# Patient Record
Sex: FEMALE | URBAN - METROPOLITAN AREA
[De-identification: names, ages, dates, MRNs, and addresses within clinical notes are randomized per-mention and may not be internally consistent; named-entity substitution may affect disease eponyms.]

---

## 2020-02-14 PROBLEM — D50.8 OTHER IRON DEFICIENCY ANEMIAS: Status: ACTIVE | Noted: 2020-02-14

## 2022-06-08 ENCOUNTER — APPOINTMENT (RX ONLY)
Dept: URBAN - METROPOLITAN AREA CLINIC 33 | Facility: CLINIC | Age: 27
Setting detail: DERMATOLOGY
End: 2022-06-08

## 2022-06-08 VITALS
DIASTOLIC BLOOD PRESSURE: 79 MMHG | HEIGHT: 60 IN | WEIGHT: 150 LBS | SYSTOLIC BLOOD PRESSURE: 120 MMHG | HEART RATE: 97 BPM

## 2022-06-08 DIAGNOSIS — R03.0 ELEVATED BLOOD-PRESSURE READING, WITHOUT DIAGNOSIS OF HYPERTENSION: ICD-10-CM

## 2022-06-08 DIAGNOSIS — L20.89 OTHER ATOPIC DERMATITIS: ICD-10-CM

## 2022-06-08 PROCEDURE — ? COUNSELING

## 2022-06-08 PROCEDURE — ? PLAN FOR BMI MANAGEMENT

## 2022-06-08 PROCEDURE — 99203 OFFICE O/P NEW LOW 30 MIN: CPT

## 2022-06-08 PROCEDURE — ? PRESCRIPTION

## 2022-06-08 RX ORDER — CLOBETASOL PROPIONATE 0.5 MG/ML
THIN LAYER SOLUTION TOPICAL
Qty: 50 | Refills: 1 | Status: ERX | COMMUNITY
Start: 2022-06-08

## 2022-06-08 RX ADMIN — CLOBETASOL PROPIONATE THIN LAYER: 0.5 SOLUTION TOPICAL at 00:00

## 2022-06-08 ASSESSMENT — LOCATION DETAILED DESCRIPTION DERM
LOCATION DETAILED: LEFT POSTERIOR AXILLA
LOCATION DETAILED: LEFT ANTERIOR LATERAL PROXIMAL THIGH
LOCATION DETAILED: RIGHT ANTERIOR PROXIMAL THIGH
LOCATION DETAILED: LEFT PROXIMAL LATERAL POSTERIOR THIGH
LOCATION DETAILED: RIGHT PROXIMAL LATERAL POSTERIOR THIGH
LOCATION DETAILED: RIGHT ANTERIOR MEDIAL PROXIMAL UPPER ARM
LOCATION DETAILED: RIGHT GLUTEAL CREASE

## 2022-06-08 ASSESSMENT — SEVERITY ASSESSMENT 2020: SEVERITY 2020: MILD

## 2022-06-08 ASSESSMENT — LOCATION SIMPLE DESCRIPTION DERM
LOCATION SIMPLE: LEFT POSTERIOR THIGH
LOCATION SIMPLE: LEFT THIGH
LOCATION SIMPLE: RIGHT THIGH
LOCATION SIMPLE: RIGHT UPPER ARM
LOCATION SIMPLE: LEFT POSTERIOR AXILLA
LOCATION SIMPLE: RIGHT POSTERIOR THIGH
LOCATION SIMPLE: RIGHT GLUTEAL CREASE

## 2022-06-08 ASSESSMENT — BSA ECZEMA: % BODY COVERED IN ECZEMA: 2

## 2022-06-08 ASSESSMENT — ITCH NUMERIC RATING SCALE: HOW SEVERE IS YOUR ITCHING?: 3

## 2022-06-08 ASSESSMENT — LOCATION ZONE DERM
LOCATION ZONE: AXILLAE
LOCATION ZONE: LEG
LOCATION ZONE: ARM

## 2022-06-08 NOTE — PROCEDURE: COUNSELING
Detail Level: Simple
Patient Specific Counseling (Will Not Stick From Patient To Patient): Patient has family history of allergies and asthma and lifelong problems she has had with eczema.  Her skin itches and she scratches.  She has topical betamethasone that she has used for \"years\" and uses it hit and miss and then moisturizes with lotion.  \\n\\nWe discussed atopic dermatitis- and also reviewed methods to treat - and believe we need to change things out to a better management program and then calm the skin down.  She needs more moisturizing.  She is amiable to trying something different and today she is in fairly good control.  We are going to use Mixed CeraVe program - she has used CeraVe cream in the past and liked it.  Recommend we do this and then re-evaluate.  We could consider Eucrisa and/or Protopic to her routine pending on outcome with use of regular moisturizing and taking measures to not scratch.  \\n\\nStop betamethasone at this time.\\n\\nReviewed handout and printed provided.  We will follow up as scheduled she is to call earlier if any concerns. \\n\\n1.  PURCHASE 2 jars (16 oz) of CeraVe cream. This is often found at your pharmacy store.  If you have trouble findings it ask your pharmacist.\\n2.   the prescription of Clobetasol Solution from your pharmacy.\\n3.  When you get home - take a marker to ONE OF THE JARS_  marking the label that says medicated and date the container. MIX ALL of the Clobetasol Solution into one jar of the CeraVe' cream and stir with a butter knife or another utensil - mix well. \\n4.  Apply the Medicated Cream to areas that bother you the most or the most irritated at least 2 - 3 x daily to the problem areas - (really good to do one of the times right after bathing!) \\n5.  THE  PLAIN NON-MEDICATED CeraVe cream needs to be applied neck to toes at least 1 x DAILY -go right over the areas where you used the medicated cream.  YOU MAY REAPPLY THE PLAIN CREAM AS MANY TIMES AS YOU WISH THROUGHOUT THE DAY - BUT HAS TO BE AT LEAST 1 X DAILY NECK TO TOES.\\n\\nGOAL:  To DECREASE and ELIMINATE THE MEDICATED CREAM and daily generous use of PLAIN non-medicated CERAVE CREAM.\\n\\nApplication challenges:\\n1.  If having difficulty reaching an area - options:\\n     *Small foam paint roller\\n      *Back/flat side of back brush covere with       saran or a small plastic bag\\n      *Long strip of vinyl from fabric store cast offs (smear and rub on back)\\n      *Long handled spatula\\n\\nSuggestion to help with itch:  \\n1.  Wet wash cloths- put in the freezer- to become frozen and then applied to an area of itch and allowed to thaw.   A bag of frozen peas covered with a dishcloth for 10-15 minutes will also do much the same thing - this will stimulate the nerves without damaging the skin.  \\n2.  May use CeraVe ITCH cream found over the counter  - for itch relief - these DO NOT REPLACE moisturizing with the plain cream.  \\n3.  Scratching the skin does not repair the skin - it feeds the problem of itch (releases more histamine) - as well as showers and baths that are long and hot. \\n4.  Storing plain cream in refrigerator can be soothing and cooling when applied to the skin\\n\\nSUGGESTED MOISTURIZERS - these are all in jars!\\n1.  Cetaphil Cream - also can be found at [employer] and [employer] online as well as any pharmacy type store\\n2.  CeraVe Cream - can be found in any pharmacy store, ordered online or in [facility] office\\n3   Vanicare Cream - can be found in any pharmacy store, ordered online - the ONLY one with a pump \\n
Detail Level: Detailed
Quality 317: Preventative Care And Screening: Screening For High Blood Pressure And Follow-Up Documented: Pre-hypertensive or hypertensive blood pressure reading documented, and the indicated follow-up is documented

## 2022-06-08 NOTE — HPI: RASH
What Type Of Note Output Would You Prefer (Optional)?: Bullet Format
Is The Patient Presenting As Previously Scheduled?: Yes
How Severe Is Your Rash?: moderate
Is This A New Presentation, Or A Follow-Up?: Referral for Rash
Additional History: Notes that she has had eczema as a child and has seasonal allergies. No asthma. Mother has eczema, no history of psoriasis.
Who Is Your Referring Provider?: Dr. Woodson